# Patient Record
(demographics unavailable — no encounter records)

---

## 2025-02-28 NOTE — NM
EXAMINATION TYPE: NM stress lexiscan cardiolite

 

DATE OF EXAM: 2/28/2025

 

COMPARISON: NONE

 

CLINICAL INDICATION: Male, 85 years old with history of I25.10 Coronary arteriosclerosis; History of 
HTN, DM, tobacco use, and hypercholesteremia.

 

TECHNIQUE:  After the intravenous administration of 10.0 mCi Tc 99m Sestamibi - Cardiolite resting SP
ECT images acquired 45 minutes post injection. 

 

The patient received 0.4mg Lexiscan, 24.8 mCi Tc 99m Sestamibi - Stress images obtained 35 minutes po
st injection 

 

FINDINGS:  

 

Review of stress and rest SPECT images demonstrates no distinct perfusion abnormality.  Gated analysi
s shows normal wall motion with an estimated left ventricular ejection fraction of 66 %.

 

 

 

IMPRESSION:  

 

No scintigraphic evidence for reversible ischemia.

 

 

 

 

[facility], Workstation: [id number], 2/28/2025 1:56 PM

## 2025-02-28 NOTE — CA
Lexiscan Nuclear Stress Test Report 

 

 Name:    Lalito Lorenzo 

 

 MRN:    [id number] 

 

 

 

 Exam Date: 2025 12:22 

 

 Exam Location:      [place]  

                     Stress 

 

 Ht (in):     69     Wt (lb):     153    BSA:    1.84 

 

 Ordering Phys:       Enoc Weaver MD 

 

 Referring Phys:      ALICJA 

 

 Technologist:        Juan Diaz 

 

 Age:    85    Gender:    M 

 

 :    1939 

 Procedure CPT: 

 

 Indications:              I25.10 coronary arteriosclerosis 

 

 ICD-10 Codes: 

 

 Patient History: 

 

 Medications:              METFORMIN, PLAVIX, METOPROLOL, PRAVASTATIN 

 

 Meds past 24 hrs: 

 

 Pretest Chest Pain: 

 

 STRESS TEST      Lexiscan 

 

 Protocol 

 

 

 

 

 Exercise Duration (min:sec):         01:05 

 Max ST Depressions (mm): 

 Angina Score: 

 Garza Score: 

 Resting HR (bpm):      60 

 

 Peak HR (bpm):         75 

 

 Resting BP (mmHg):       150    /   59 

 

 Peak BP (mmHg):       173   /   54 

 

 MPHR:    135     Target HR:      115 

 

 % MPHR:     56 

 METS:  1.0 

 

 Total Dose: 

 Peak Dose: 

 Atropine: 

 Double Product:       20707 

 

 BP Response: 

 

 Stress Termination:       INFUSION COMPLETE 

 

 Stress Symptoms: 

 NO SYMPTOMS 

 

 Stress Summary: 

 

 

  ECG ANALYSIS 

 

 Resting ECG: 

 

 Stress ECG: 

 

 CONCLUSIONS 

 RESTING EKG: [Normal sinus rhythm, normal EKG]  

 

 Patient recieved IV infusion of Lexiscan 0.4mg and at peak  

 infusion.  Patient did not experience any significant symptoms  

 with Lexiscan infusion.  Patient had normal hemodynamic and  

 clinical response to Lexiscan infusion. 

 

 STRESS EKG showed: [No significant ST-T wave changes diagnostic  

 for ischemia by ST segment analysis] 

 

 ARRYTHMIAS: [No ectopic rhythms or sustained arrythmias] 

 

 CONCLUSION: 

 1. Normal hemodynamic and clinical response to Lexiscan  

 infusion.  

 2. Non-ischemic EKG response to lexiscan infusion 

 

 Please refer to the nuclear imaging portion of this stress test  

 for complete interpretation of the study. 

 

 Dr Gil Byrd 

 (Electronically Signed) 

 Final Date:      2025 13:29

## 2025-03-01 NOTE — CA
Transthoracic Echo Report 

 Name: Lalito Lorenzo 

 MRN:    [id number] 

 Age:    85     Gender:     M 

 

 :    1939 

 Exam Date:     2025 12:39 

 Exam Location: [facility] 

 Ht (in):     69     Wt (lb):     150 

 Ordering Physician:        Enoc Waever MD 

 Attending/Referring Phys: 

 Technician         Angelia Cash RDCS 

 Procedure CPT: 

 Indications:       I25.10 coronary arteriosclerosis 

 

 Cardiac Hx: 

 Technical Quality:      Fair 

 Contrast 1:                                Total Dose (mL): 

 Contrast 2:                                Total Dose (mL): 

 

 MEASUREMENTS  (Male / Female) Normal Values 

 2D ECHO 

 LV Diastolic Diameter PLAX        4.1 cm                4.2 - 5.9 / 3.9 - 5.3 cm 

 LV Systolic Diameter PLAX         2.8 cm                 

 IVS Diastolic Thickness           1.4 cm                0.6 - 1.0 / 0.6 - 0.9 cm 

 LVPW Diastolic Thickness          1.2 cm                0.6 - 1.0 / 0.6 - 0.9 cm 

 LV Relative Wall Thickness        0.7                    

 RV Internal Dim ED PLAX           2.2 cm                 

 LA Systolic Diameter LX           3.7 cm                3.0 - 4.0 / 2.7 - 3.8 cm 

 LV Diastolic Volume MOD BP        54.6 cm???              67 - 155 / 56 - 104 cm??? 

 LV Systolic Volume MOD BP         15.6 cm???              22 - 58 / 19 - 49 cm??? 

 LV Ejection Fraction MOD BP       71.5 %                >= 55  % 

 LV Cardiac Index MOD BP           1867.9 cm???/min???m???      

 LV Diastolic Volume MOD 4C        50.5 cm???               

 LV Systolic Volume MOD 4C         15.6 cm???               

 LV Ejection Fraction MOD 4C       69.1 %                 

 LV Cardiac Index MOD 4C           1667.6 cm???/min???m???      

 LV Diastolic Length 4C            6.9 cm                 

 LV Systolic Length 4C             6.3 cm                 

 LV Diastolic Volume MOD 2C        52.2 cm???               

 LV Systolic Volume MOD 2C         15.8 cm???               

 LV Ejection Fraction MOD 2C       69.6 %                 

 LV Cardiac Index MOD 2C           1738.5 cm???/min???m???      

 LV Diastolic Length 2C            8.0 cm                 

 LV Systolic Length 2C             6.3 cm                 

 LA Volume                         39.5 cm???              18 - 58 / 22 - 52 cm??? 

 LA Volume Index                   21.7 cm???/m???           16 - 28 cm???/m??? 

 

 M-MODE 

 Aortic Root Diameter MM           3.4 cm                 

 LA Systolic Diameter MM           3.0 cm                 

 LA Ao Ratio MM                    0.9                    

 AV Cusp Separation MM             2.0 cm                 

 

 DOPPLER 

 AI Peak Velocity                  228.4 cm/s             

 AI Peak Gradient                  20.9 mmHg              

 AI Pressure Half Time             909.0 ms               

 MV Area PHT                       3.5 cm???                

 Mitral E Point Velocity           90.8 cm/s              

 Mitral A Point Velocity           98.3 cm/s              

 Mitral E to A Ratio               0.9                    

 MV Deceleration Time              214.4 ms               

 TR Peak Velocity                  292.2 cm/s             

 TR Peak Gradient                  34.2 mmHg              

 Right Ventricular Systolic Press  39.1 mmHg              

 

 

 FINDINGS 

 Left Ventricle 

 Left ventricular ejection fraction is estimated at 55-60 %. Moderately  

 increased septal wall thickness. Normal left ventricular systolic function with  

 no obvious regional wall motion abnormalities. Left ventricular cavity size  

 normal. 

 

 Right Ventricle 

 Normal right ventricular size and function. Mild pulmonary hypertension. 

 

 Right Atrium 

 Normal right atrial size. 

 

 Left Atrium 

 Normal left atrial size. 

 

 Mitral Valve 

 Structurally normal mitral valve. Moderate mitral regurgitation. No mitral  

 stenosis. 

 

 Aortic Valve 

 Trileaflet aortic valve. Mild aortic regurgitation. No aortic stenosis. 

 

 Tricuspid Valve 

 Structurally normal tricuspid valve. Mild tricuspid regurgitation. No tricuspid  

 stenosis. 

 

 Pulmonic Valve 

 Structurally normal pulmonic valve. Mild pulmonic regurgitation. No pulmonic  

 stenosis. 

 

 Pericardium 

 No pericardial or pleural effusion. 

 

 Aorta 

 Normal size aortic root and proximal ascending aorta. 

 

 CONCLUSIONS 

 Ejection fraction 55-60% 

 Moderate increased left injury wall thickness 

 Moderate mitral regurgitation 

 Mild tricuspid regurgitation 

 No pericardial effusion 

 Previewed by:  

 Dr. Marty Means DO 

 (Electronically Signed) 

 Final Date:      2025 12:27